# Patient Record
Sex: MALE | Race: WHITE | NOT HISPANIC OR LATINO | Employment: UNEMPLOYED | URBAN - METROPOLITAN AREA
[De-identification: names, ages, dates, MRNs, and addresses within clinical notes are randomized per-mention and may not be internally consistent; named-entity substitution may affect disease eponyms.]

---

## 2019-10-22 ENCOUNTER — TELEPHONE (OUTPATIENT)
Dept: OTHER | Facility: CLINIC | Age: 49
End: 2019-10-22

## 2019-10-23 NOTE — TELEPHONE ENCOUNTER
"Pt answered \"fair\" for overall health, declined any assistance scheduling appointments or finding health services.  "

## 2022-08-19 ENCOUNTER — HOSPITAL ENCOUNTER (EMERGENCY)
Facility: CLINIC | Age: 52
Discharge: LEFT BEFORE DISCHARGE | End: 2022-08-19
Attending: EMERGENCY MEDICINE | Admitting: EMERGENCY MEDICINE

## 2022-08-19 VITALS
DIASTOLIC BLOOD PRESSURE: 67 MMHG | WEIGHT: 160 LBS | TEMPERATURE: 98 F | BODY MASS INDEX: 22.9 KG/M2 | SYSTOLIC BLOOD PRESSURE: 96 MMHG | OXYGEN SATURATION: 96 % | HEART RATE: 104 BPM | HEIGHT: 70 IN | RESPIRATION RATE: 18 BRPM

## 2022-08-19 LAB — ALCOHOL BREATH TEST: 0.14 (ref 0–0.01)

## 2022-08-19 PROCEDURE — 82075 ASSAY OF BREATH ETHANOL: CPT | Performed by: EMERGENCY MEDICINE

## 2022-08-19 PROCEDURE — 999N000104 HC STATISTIC NO CHARGE: Performed by: EMERGENCY MEDICINE

## 2022-08-19 NOTE — ED TRIAGE NOTES
Patient states he is here for detox, he states to have been on a watt for the last 5 months and drinks about a 6 pack and a pint. Denies a history of seizures with withdrawal.      Triage Assessment     Row Name 08/19/22 1897       Triage Assessment (Adult)    Airway WDL WDL       Respiratory WDL    Respiratory WDL WDL       Skin Circulation/Temperature WDL    Skin Circulation/Temperature WDL WDL       Cardiac WDL    Cardiac WDL WDL       Peripheral/Neurovascular WDL    Peripheral Neurovascular WDL WDL       Cognitive/Neuro/Behavioral WDL    Cognitive/Neuro/Behavioral WDL WDL

## 2022-08-19 NOTE — ED PROVIDER NOTES
ED Provider Note  [facility]    I was informed by the nurse after signing up to see the patient that he had demanded to leave and the nurse had let him go without being seen after he signed declination of screening exam paperwork.  The nurse reported that he had a sober ride as his wife was here with him and agreed to take him home.  The nurse also reported that he had denied any suicidal or homicidal ideation.  As he had left without being seen I was unable to evaluate the patient or discuss risks of leaving without evaluation.  Per RN, he reportedly ambulated out of the department with his wife.      Georgia Huerta MD  [facility] EMERGENCY DEPARTMENT  8/19/2022     Georgia Huerta MD  08/19/22 0911